# Patient Record
Sex: FEMALE | Race: WHITE | NOT HISPANIC OR LATINO | Employment: FULL TIME | URBAN - METROPOLITAN AREA
[De-identification: names, ages, dates, MRNs, and addresses within clinical notes are randomized per-mention and may not be internally consistent; named-entity substitution may affect disease eponyms.]

---

## 2024-10-13 ENCOUNTER — HOSPITAL ENCOUNTER (EMERGENCY)
Facility: HOSPITAL | Age: 64
Discharge: HOME | End: 2024-10-13
Attending: EMERGENCY MEDICINE | Admitting: EMERGENCY MEDICINE
Payer: COMMERCIAL

## 2024-10-13 VITALS
DIASTOLIC BLOOD PRESSURE: 73 MMHG | OXYGEN SATURATION: 99 % | HEART RATE: 67 BPM | HEIGHT: 64 IN | RESPIRATION RATE: 16 BRPM | BODY MASS INDEX: 30.05 KG/M2 | TEMPERATURE: 98.4 F | SYSTOLIC BLOOD PRESSURE: 135 MMHG | WEIGHT: 176 LBS

## 2024-10-13 DIAGNOSIS — M54.32 SCIATICA OF LEFT SIDE: Primary | ICD-10-CM

## 2024-10-13 PROCEDURE — 63710000001 PREDNISONE PER 1 MG

## 2024-10-13 PROCEDURE — 25010000002 KETOROLAC TROMETHAMINE PER 15 MG

## 2024-10-13 PROCEDURE — 96372 THER/PROPH/DIAG INJ SC/IM: CPT

## 2024-10-13 PROCEDURE — 99283 EMERGENCY DEPT VISIT LOW MDM: CPT

## 2024-10-13 RX ORDER — BACLOFEN 10 MG/1
10 TABLET ORAL 3 TIMES DAILY
Qty: 12 TABLET | Refills: 0 | Status: SHIPPED | OUTPATIENT
Start: 2024-10-13 | End: 2024-10-17

## 2024-10-13 RX ORDER — KETOROLAC TROMETHAMINE 15 MG/ML
15 INJECTION, SOLUTION INTRAMUSCULAR; INTRAVENOUS ONCE
Status: COMPLETED | OUTPATIENT
Start: 2024-10-13 | End: 2024-10-13

## 2024-10-13 RX ORDER — PREDNISONE 20 MG/1
60 TABLET ORAL ONCE
Status: COMPLETED | OUTPATIENT
Start: 2024-10-13 | End: 2024-10-13

## 2024-10-13 RX ORDER — PREDNISONE 50 MG/1
50 TABLET ORAL DAILY
Qty: 4 TABLET | Refills: 0 | Status: SHIPPED | OUTPATIENT
Start: 2024-10-14 | End: 2024-10-18

## 2024-10-13 RX ORDER — KETOROLAC TROMETHAMINE 10 MG/1
10 TABLET, FILM COATED ORAL EVERY 6 HOURS PRN
Qty: 16 TABLET | Refills: 0 | Status: SHIPPED | OUTPATIENT
Start: 2024-10-13 | End: 2024-10-17

## 2024-10-13 RX ADMIN — KETOROLAC TROMETHAMINE 15 MG: 15 INJECTION, SOLUTION INTRAMUSCULAR; INTRAVENOUS at 13:56

## 2024-10-13 RX ADMIN — PREDNISONE 60 MG: 20 TABLET ORAL at 13:56

## 2024-10-13 NOTE — [facility] PROVIDER NOTE
Subjective   History of Present Illness    Patient is a 64-year-old female presents to ED with complaints of lower back pain.  Patient states pain has been increasing since Thursday.  Patient denies any known injury or known mechanism of action for pain.  Patient states that pain wraps around her [place] down to the top of her leg.  Patient denying any unilateral leg weakness, difficulty ambulation, saddle anesthesia, urinary retention, urinary or bowel incontinence.  Patient also denying any overlying skin changes, fevers, chills, nausea, vomiting, urinary symptoms.  Patient is try to utilize Tylenol ibuprofen at home with no resolution of her symptom burden.  Patient denies any history of lower back problems prior to this besides usual aches and pains.      Review of Systems   Constitutional:  Negative for chills, fatigue and fever.   HENT:  Negative for hearing loss.    Eyes:  Negative for visual disturbance.   Respiratory:  Negative for cough and shortness of breath.    Cardiovascular:  Negative for chest pain.   Gastrointestinal:  Negative for abdominal pain, nausea and vomiting.   Genitourinary:  Negative for dysuria and flank pain.   Musculoskeletal:  Positive for back pain. Negative for joint swelling.   Skin:  Negative for color change and rash.   Neurological:  Negative for weakness and headaches.       No past medical history on file.    No Known Allergies    No past surgical history on file.    No family history on file.    Social History     Socioeconomic History    Marital status:            Objective   Physical Exam  Constitutional:       General: She is not in acute distress.     Appearance: Normal appearance. She is not ill-appearing.   HENT:      Head: Normocephalic and atraumatic.      Mouth/Throat:      Mouth: Mucous membranes are moist.      Pharynx: Oropharynx is clear.   Cardiovascular:      Rate and Rhythm: Normal rate and regular rhythm.   Pulmonary:      Effort: Pulmonary effort is  normal.      Breath sounds: Normal breath sounds.   Abdominal:      General: Abdomen is flat.      Palpations: Abdomen is soft.      Tenderness: There is no abdominal tenderness. There is no right CVA tenderness or left CVA tenderness.   Musculoskeletal:      Comments: Curve of lumbar spine all within normal limits. ROM slightly limited secondary to pain. No obvious bony or step-off deformity noted.  No overlying erythema, edema, rash, lesions, exudates noted.  No spinous tenderness appreciated. Pain localized left to musculature of lumbar spine with exquisite tenderness over sciatic nerve along lateral aspect of left glute.    Skin:     General: Skin is warm and dry.      Capillary Refill: Capillary refill takes less than 2 seconds.   Neurological:      Mental Status: She is alert and oriented to person, place, and time.   Psychiatric:         Mood and Affect: Mood normal.         Behavior: Behavior normal.         Procedures           ED Course                                           Medical Decision Making    Patient is a 64-year-old female who presents to ED with complaints of left-sided back pain with radicular symptoms. Patient denying any saddle anesthesia, unilateral bilateral leg weakness, urinary tension, urinary or bowel incontinence.  Patient afebrile and without any overlying erythema, edema, warmth, exudates, obvious bony deformity.  No crepitus or bullae formation noted.  Patient given Toradol, prednisone here in ED to manage symptom burden which greatly reduce symptoms.  Rx for Toradol, prednisone, baclofen sent to pharmacy.  Importance of rest, ice, heat, stretching also discussed.  Patient given orthopedic referral at discharge.  Importance of follow-up care also with PCP for reevaluation to discuss today's encounter also discussed. Patient immediately to return to ED should symptoms worsen anytime.  Patient verbalized understanding.      Final diagnoses:   Sciatica of left side       ED  Disposition  ED Disposition       ED Disposition   Discharge    Condition   Stable    Comment   --               [facility] EMERGENCY DEPARTMENT [place]  [address]  [facility] [address] [phone]    As needed, If symptoms worsen         Medication List        New Prescriptions      baclofen 10 MG tablet  Commonly known as: LIORESAL  Take 1 tablet by mouth 3 (Three) Times a Day for 4 days.     ketorolac 10 MG tablet  Commonly known as: TORADOL  Take 1 tablet by mouth Every 6 (Six) Hours As Needed for Moderate Pain for up to 4 days.     predniSONE 50 MG tablet  Commonly known as: DELTASONE  Take 1 tablet by mouth Daily for 4 days.               Where to Get Your Medications        These medications were sent to [facility] [id number] - [place], [place] - [address] - [phone]  - [phone]   [address] [address]      Phone: [phone]   baclofen 10 MG tablet  ketorolac 10 MG tablet  predniSONE 50 MG tablet

## 2024-10-13 NOTE — DISCHARGE INSTRUCTIONS
Today we gave you a pain shot and started you on your steroids as we discussed.  Please begin taking rest of steroids tomorrow morning; we have also sent you in some medicine to help with pain as well as your muscle spasms.  While taking pain medicine please do not take any ibuprofen or naproxen.  Please continue to rest, ice, heat, stretch your hip as we discussed.  Make follow-up appointment with PCP for reevaluation and to ensure resolution of your symptoms.  Return to ED should symptoms worsen any time.